# Patient Record
(demographics unavailable — no encounter records)

---

## 2025-03-21 NOTE — ASSESSMENT
[FreeTextEntry1] : In brief, this patient is a pleasant 62-year-old female who presents with longstanding history of gallstones that are becoming more symptomatic with time.  On her most recent ER presentation her ultrasound showed very numerous gallstones within the gallbladder lumen.  She had no evidence of acute cholecystitis at that time and her white count was normal.  Recommend proceeding for robotic cholecystectomy to prevent further attacks.

## 2025-03-21 NOTE — PHYSICAL EXAM
[JVD] : no jugular venous distention  [Normal Breath Sounds] : Normal breath sounds [Normal Heart Sounds] : normal heart sounds [Normal Rate and Rhythm] : normal rate and rhythm [No Rash or Lesion] : No rash or lesion [Alert] : alert [Oriented to Person] : oriented to person [Oriented to Place] : oriented to place [Oriented to Time] : oriented to time [Calm] : calm [de-identified] : Well-appearing, no acute distress [de-identified] : Soft, nondistended, nontender, positive bowel sounds, negative Edge's [de-identified] : No CVA tenderness

## 2025-03-21 NOTE — HISTORY OF PRESENT ILLNESS
[de-identified] : This patient is a pleasant 62-year-old female with history of multiple C-sections as well as known gallstones who presents status post recent ER presentation with right upper quadrant and back pain.  She underwent a full workup including CT scanning, right upper quadrant ultrasound as well as lab tests that revealed the presence of numerous gallstones.  Patient states that she has known about her gallstones for over 20 years and she has had multiple attacks of pain in the past.  She denies history of pancreatitis or jaundice.  Denies recent unexplained weight changes.  She tries to adhere to a very low-fat diet.  No acute changes in bowel function or nausea/vomiting.  No hematemesis.

## 2025-04-22 NOTE — ASSESSMENT
[FreeTextEntry1] : This is a 62 year old woman with known gallstones and upcoming scheduled cholecystectomy presents to office today with right upper back pain. Etiology of pain does not seem gallbladder related. Recommend following with orthopedics for further evaluation.

## 2025-04-22 NOTE — HISTORY OF PRESENT ILLNESS
[de-identified] : Ms. PERDOMO is a 62 year old woman with known gallstones who presents today with today with right upper back pain. Patient reports pain is not associated with food. Associates pain with certain activity; getting out of car and turning over in bed. Denies fever/chills/nausea/emesis, denies changes in bowel or bladder habits. Tolerating diet, reports normal bowel movements.

## 2025-04-22 NOTE — PHYSICAL EXAM
[JVD] : no jugular venous distention  [Normal Thyroid] : the thyroid was normal [No Rash or Lesion] : No rash or lesion [Alert] : alert [Oriented to Person] : oriented to person [Oriented to Place] : oriented to place [Oriented to Time] : oriented to time [Calm] : calm [de-identified] :  No acute distress [de-identified] :  No respiratory distress [de-identified] :  Regular rate [de-identified] : soft, nontender. no rebound or guarding. negative ward's sign  [de-identified] :  normal range of motion, asymmetry noted right upper back

## 2025-04-22 NOTE — PLAN
[FreeTextEntry1] : Chest CT  Follow with orthopedic   Robotic cholecystectomy as planned.    I, Dr. De La Torre, personally performed the evaluation and management (E/M) services for this established patient who presents today with (a) new problem(s)/exacerbation of (an) existing condition(s).  That E/M includes conducting the clinically appropriate interval history &/or exam, assessing all new/exacerbated conditions, and establishing a new plan of care.  Today, my PAUL, Annmarie Sears, was here to observe in the visit & follow plan of care established by me.

## 2025-04-24 NOTE — PHYSICAL EXAM
[de-identified] : CONSTITUTIONAL: The patient is a very pleasant individual who is well-nourished and who appears stated age. PSYCHIATRIC: The patient is alert and oriented X 3 and in no apparent distress, and participates with orthopedic evaluation well. HEAD: Atraumatic and is nonsyndromic in appearance. EENT: No visible thyromegaly, EOMI. RESPIRATORY: Respiratory rate is regular, not dyspneic on examination. LYMPHATICS: There is no inguinal lymphadenopathy INTEGUMENTARY: Skin is clean, dry, and intact about the bilateral lower extremities and lumbar spine. VASCULAR: There is brisk capillary refill about the bilateral lower extremities. NEUROLOGIC: There are no pathologic reflexes. There is no decrease in sensation of the bilateral lower extremities on manual  examination. Deep tendon reflexes are well maintained at 2+/4 of the bilateral lower extremities and are symmetric.. MUSCULOSKELETAL: There is no visible muscular atrophy. Manual motor strength is well maintained in the bilateral lower extremities. Range of motion of lumbar spine is well maintained. The patient ambulates in a non-myelopathic manner. Negative tension sign and straight leg raise bilaterally. Quad extension, ankle dorsiflexion, EHL, plantar flexion, and ankle eversion are well preserved. Normal secondary orthopaedic exam of bilateral hips, greater trochanteric area, knees and ankles, right sided gibbus associated with rotoscoliosis as well as intercostal myositis and paraspinal myositis [de-identified] : X-rays have been reviewed that shows thoracolumbar scoliosis with a concavity on the left and a convexity on the right this right sided convexity at the visit consistent with her right sided gibbus.

## 2025-04-24 NOTE — DISCUSSION/SUMMARY
[de-identified] : Very pleasant woman presents for evaluation of underlying rotoscoliosis she has absolutely no contraindications to her upcoming gallbladder surgery.  She is radha follow-up in approximately 4 to 6 weeks after she heals and recovers from her gallbladder surgery for reassessment her initial phase of treatment is going to be topical modalities home exercises and physical therapy.

## 2025-04-24 NOTE — HISTORY OF PRESENT ILLNESS
[de-identified] : Opal Torres is a very pleasant 62-year-old female presenting for a thoracolumbar prominence.  She is currently scheduled for a gallbladder resection in the early part of May.  When she was sent here by the surgical team for further evaluation.  Patient has had multiple day flareup of thoracolumbar pain after twisting getting out of a car and some intermittent history of low back pain as well. [Worsening] : worsening [___ wks] : [unfilled] week(s) ago [2] : a current pain level of 2/10 [1] : an average pain level of 1/10 [0] : a minimum pain level of 0/10 [10] : a maximum pain level of 10/10 [Intermit.] : ~He/She~ states the symptoms seem to be intermittent [Bending] : worsened by bending [Lifting] : worsened by lifting [Rest] : relieved by rest [Ataxia] : no ataxia [Incontinence] : no incontinence [Loss of Dexterity] : good dexterity [Urinary Ret.] : no urinary retention [de-identified] : Twisting

## 2025-05-08 NOTE — HISTORY OF PRESENT ILLNESS
[de-identified] : This patient is a pleasant 62-year-old female with history of multiple C-sections as well as known gallstones who presents status post recent ER presentation with right upper quadrant and back pain.  She underwent a full workup including CT scanning, right upper quadrant ultrasound as well as lab tests that revealed the presence of numerous gallstones.  Patient states that she has known about her gallstones for over 20 years and she has had multiple attacks of pain in the past.  She denies history of pancreatitis or jaundice.  Denies recent unexplained weight changes.  She tries to adhere to a very low-fat diet.  No acute changes in bowel function or nausea/vomiting.  No hematemesis. [de-identified] : Here for postop follow-up. No abd pain, no fevers or chills. No path results yet. Eating well with normal bowel function.

## 2025-05-08 NOTE — PHYSICAL EXAM
[JVD] : no jugular venous distention  [Normal Breath Sounds] : Normal breath sounds [Normal Heart Sounds] : normal heart sounds [Normal Rate and Rhythm] : normal rate and rhythm [No Rash or Lesion] : No rash or lesion [Alert] : alert [Oriented to Person] : oriented to person [Oriented to Place] : oriented to place [Oriented to Time] : oriented to time [Calm] : calm [de-identified] : Well-appearing, no acute distress [de-identified] : Soft, nondistended, nontender, positive bowel sounds, negative Edge's. incisions c/d/i, no erythema [de-identified] : No CVA tenderness

## 2025-05-08 NOTE — ASSESSMENT
[FreeTextEntry1] : Doing well s/p robotic roxana. f/u as needed. Resume normal activities and diet.

## 2025-05-19 NOTE — PHYSICAL EXAM
[JVD] : no jugular venous distention  [Normal Breath Sounds] : Normal breath sounds [Normal Heart Sounds] : normal heart sounds [Normal Rate and Rhythm] : normal rate and rhythm [No Rash or Lesion] : No rash or lesion [Alert] : alert [Oriented to Person] : oriented to person [Oriented to Place] : oriented to place [Oriented to Time] : oriented to time [Calm] : calm [de-identified] : Well-appearing, no acute distress [de-identified] : Soft, nondistended, nontender, positive bowel sounds, negative Edge's. incisions c/d/i, no erythema [de-identified] : No CVA tenderness

## 2025-05-19 NOTE — HISTORY OF PRESENT ILLNESS
[de-identified] : This patient is a pleasant 62-year-old female with history of multiple C-sections as well as known gallstones who presents status post recent ER presentation with right upper quadrant and back pain.  She underwent a full workup including CT scanning, right upper quadrant ultrasound as well as lab tests that revealed the presence of numerous gallstones.  Patient states that she has known about her gallstones for over 20 years and she has had multiple attacks of pain in the past.  She denies history of pancreatitis or jaundice.  Denies recent unexplained weight changes.  She tries to adhere to a very low-fat diet.  No acute changes in bowel function or nausea/vomiting.  No hematemesis. [de-identified] : Here for postop follow-up. No abd pain, no fevers or chills. Eating well with normal bowel function. Path negative for malignancy